# Patient Record
Sex: FEMALE
[De-identification: names, ages, dates, MRNs, and addresses within clinical notes are randomized per-mention and may not be internally consistent; named-entity substitution may affect disease eponyms.]

---

## 2023-08-03 ENCOUNTER — DASHBOARD ENCOUNTERS (OUTPATIENT)
Age: 38
End: 2023-08-03

## 2023-08-03 ENCOUNTER — OFFICE VISIT (OUTPATIENT)
Dept: URBAN - METROPOLITAN AREA CLINIC 12 | Facility: CLINIC | Age: 38
End: 2023-08-03

## 2023-08-03 ENCOUNTER — LAB OUTSIDE AN ENCOUNTER (OUTPATIENT)
Dept: URBAN - METROPOLITAN AREA CLINIC 12 | Facility: CLINIC | Age: 38
End: 2023-08-03

## 2023-08-03 ENCOUNTER — WEB ENCOUNTER (OUTPATIENT)
Dept: URBAN - METROPOLITAN AREA CLINIC 12 | Facility: CLINIC | Age: 38
End: 2023-08-03

## 2023-08-03 VITALS
HEIGHT: 67 IN | TEMPERATURE: 97.3 F | HEART RATE: 105 BPM | DIASTOLIC BLOOD PRESSURE: 75 MMHG | SYSTOLIC BLOOD PRESSURE: 110 MMHG | BODY MASS INDEX: 24.55 KG/M2 | WEIGHT: 156.4 LBS

## 2023-08-03 DIAGNOSIS — K64.4 EXTERNAL HEMORRHOID: ICD-10-CM

## 2023-08-03 DIAGNOSIS — R19.7 DIARRHEA, UNSPECIFIED TYPE: ICD-10-CM

## 2023-08-03 PROCEDURE — 99244 OFF/OP CNSLTJ NEW/EST MOD 40: CPT | Performed by: STUDENT IN AN ORGANIZED HEALTH CARE EDUCATION/TRAINING PROGRAM

## 2023-08-03 PROCEDURE — 99204 OFFICE O/P NEW MOD 45 MIN: CPT | Performed by: STUDENT IN AN ORGANIZED HEALTH CARE EDUCATION/TRAINING PROGRAM

## 2023-08-03 RX ORDER — SODIUM PICOSULFATE, MAGNESIUM OXIDE, AND ANHYDROUS CITRIC ACID 12; 3.5; 1 G/175ML; G/175ML; MG/175ML
175 ML THE FIRST DOSE THE EVENING BEFORE AND SECOND DOSE THE MORNING OF COLONOSCOPY LIQUID ORAL ONCE A DAY
Qty: 700 | Refills: 0 | OUTPATIENT
Start: 2023-08-03 | End: 2023-08-05

## 2023-08-03 RX ORDER — HYDROCORTISONE 25 MG/G
1 APPLICATION CREAM TOPICAL TWICE A DAY
Qty: 1 TUBE | Refills: 3 | OUTPATIENT
Start: 2023-08-03 | End: 2023-09-28

## 2023-08-03 NOTE — HPI-TODAY'S VISIT:
This patient was referred by Dr. Salas for an evaluation of bloody diarrhea.  A copy of this will be sent to the referring provider. 39 yo F here for evaluation of change in bowel movements.  She has a h/o Sommer Danlos and POTS.  June 24 said it hit her like a ton of bricks. having 10-20 BMs a day. Bloody.  Nocturnal Sx. Has had fecal incontinence. Endorses blood in stool.  She went to the ER in [place].  Her PCP ordered CT scan, which showed colitis. Mid July,  he gave her cipro and flagyl for 10 day course, no relief of symptoms.  Dicyclomine for cramping, says it does not help much.  No recent change in medications. She was taking Zoloft for 11 years, discontinued 1 week prior to onset of Sx.

## 2023-08-08 ENCOUNTER — OFFICE VISIT (OUTPATIENT)
Dept: URBAN - METROPOLITAN AREA LAB 3 | Facility: LAB | Age: 38
End: 2023-08-08

## 2023-08-08 DIAGNOSIS — K63.89 OTHER SPECIFIED DISEASES OF INTESTINE: ICD-10-CM

## 2023-08-08 DIAGNOSIS — R19.7 ACUTE DIARRHEA: ICD-10-CM

## 2023-08-08 LAB
A/G RATIO: 1.9
ABSOLUTE BASOPHILS: 64
ABSOLUTE EOSINOPHILS: 355
ABSOLUTE LYMPHOCYTES: 2348
ABSOLUTE MONOCYTES: 601
ABSOLUTE NEUTROPHILS: 5733
ALBUMIN: 4
ALKALINE PHOSPHATASE: 56
ALT (SGPT): 8
AST (SGOT): 9
BASOPHILS: 0.7
BILIRUBIN, TOTAL: 0.2
BUN/CREATININE RATIO: 6
BUN: 4
C-REACTIVE PROTEIN, QUANT: 4.3
CALCIUM: 9.3
CARBON DIOXIDE, TOTAL: 26
CHLORIDE: 109
CREATININE: 0.68
EGFR: 113
EOSINOPHILS: 3.9
FERRITIN, SERUM: 21
FOLATE (FOLIC ACID), SERUM: 23.8
GLOBULIN, TOTAL: 2.1
GLUCOSE: 96
HEMATOCRIT: 37.9
HEMOGLOBIN: 12.5
IRON BIND.CAP.(TIBC): 228
IRON SATURATION: 11
IRON: 26
LYMPHOCYTES: 25.8
MCH: 30.3
MCHC: 33
MCV: 91.8
MONOCYTES: 6.6
MPV: 10.7
NEUTROPHILS: 63
PLATELET COUNT: 331
POTASSIUM: 4.7
PROTEIN, TOTAL: 6.1
RDW: 13
RED BLOOD CELL COUNT: 4.13
SED RATE BY MODIFIED: 2
SODIUM: 141
VITAMIN B12: 1383
VITAMIN D,25-OH,TOTAL,IA: 58
WHITE BLOOD CELL COUNT: 9.1

## 2023-08-08 PROCEDURE — 45380 COLONOSCOPY AND BIOPSY: CPT | Performed by: STUDENT IN AN ORGANIZED HEALTH CARE EDUCATION/TRAINING PROGRAM

## 2023-08-08 RX ORDER — HYDROCORTISONE 25 MG/G
1 APPLICATION CREAM TOPICAL TWICE A DAY
Qty: 1 TUBE | Refills: 3 | Status: ACTIVE | COMMUNITY
Start: 2023-08-03 | End: 2023-09-28

## 2023-08-23 ENCOUNTER — OFFICE VISIT (OUTPATIENT)
Dept: URBAN - METROPOLITAN AREA CLINIC 12 | Facility: CLINIC | Age: 38
End: 2023-08-23

## 2023-10-05 ENCOUNTER — OFFICE VISIT (OUTPATIENT)
Dept: URBAN - METROPOLITAN AREA CLINIC 12 | Facility: CLINIC | Age: 38
End: 2023-10-05